# Patient Record
Sex: MALE | Race: WHITE | NOT HISPANIC OR LATINO | URBAN - METROPOLITAN AREA
[De-identification: names, ages, dates, MRNs, and addresses within clinical notes are randomized per-mention and may not be internally consistent; named-entity substitution may affect disease eponyms.]

---

## 2023-01-14 ENCOUNTER — EMERGENCY (EMERGENCY)
Facility: HOSPITAL | Age: 43
LOS: 0 days | Discharge: HOME | End: 2023-01-14
Attending: EMERGENCY MEDICINE | Admitting: EMERGENCY MEDICINE
Payer: COMMERCIAL

## 2023-01-14 VITALS
WEIGHT: 188.05 LBS | HEIGHT: 69 IN | DIASTOLIC BLOOD PRESSURE: 88 MMHG | OXYGEN SATURATION: 98 % | TEMPERATURE: 98 F | SYSTOLIC BLOOD PRESSURE: 135 MMHG | RESPIRATION RATE: 19 BRPM | HEART RATE: 85 BPM
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
## 2023-01-14 DIAGNOSIS — S92.512A DISPLACED FRACTURE OF PROXIMAL PHALANX OF LEFT LESSER TOE(S), INITIAL ENCOUNTER FOR CLOSED FRACTURE: ICD-10-CM

## 2023-01-14 DIAGNOSIS — M79.672 PAIN IN LEFT FOOT: ICD-10-CM

## 2023-01-14 PROCEDURE — 99284 EMERGENCY DEPT VISIT MOD MDM: CPT

## 2023-01-14 PROCEDURE — 73630 X-RAY EXAM OF FOOT: CPT | Mod: 26,LT

## 2023-01-14 RX ORDER — IBUPROFEN 200 MG
600 TABLET ORAL ONCE
Refills: 0 | Status: DISCONTINUED | OUTPATIENT
Start: 2023-01-14 | End: 2023-01-14

## 2023-01-14 NOTE — ED PROVIDER NOTE - CARE PROVIDER_API CALL
Indio Akins (DPM)  Podiatric Medicine and Surgery  [address], 1st [address], [address]  [place] [address]  Phone: [phone]  Fax: [phone]  Follow Up Time:

## 2023-01-14 NOTE — ED PROVIDER NOTE - NS ED ATTENDING STATEMENT MOD
This was a shared visit with the ARMAND. I reviewed and verified the documentation and independently performed the documented:

## 2023-01-14 NOTE — ED PROVIDER NOTE - OBJECTIVE STATEMENT
42y M no [facility] presents for eval of L foot injury. Pt was walking at home when he hit his L 4th toe into furniture and since has moderate aching pain localized with toe with associated deformity and bruising. Aggravated by touch, no relieving factors. Denies numbness, weakness

## 2023-01-14 NOTE — ED PROVIDER NOTE - CLINICAL SUMMARY MEDICAL DECISION MAKING FREE TEXT BOX
42-year-old male, no past medical history, comes in complaining of left foot pain which started when he stubbed his fourth toe into the furniture.  Now he is toe is deformed and bruised.  Aggravated by touch and ambulation.  No other injuries.  On exam, pt in NAD, AAOx3, head NC/AT, CN II-XII intact, lungs CTA B/L, CV S1S2 regular, abdomen soft/NT/ND/(+)BS, ext (+) TTP and deformity to left 4th digit, motor 5/5x4, sensation intact.  X-ray done and shows displaced oblique fracture to left fourth proximal phalanx.  Toe reduced and kelyl taped.  Ortho shoe given.  Will DC with podiatry follow-up.  Motrin for pain.

## 2023-01-14 NOTE — ED PROVIDER NOTE - ATTENDING APP SHARED VISIT CONTRIBUTION OF CARE
42-year-old male, no past medical history, comes in complaining of left foot pain which started when he stubbed his fourth toe into the furniture.  Now he is toe is deformed and bruised.  Aggravated by touch and ambulation.  No other injuries.  On exam, pt in NAD, AAOx3, head NC/AT, CN II-XII intact, lungs CTA B/L, CV S1S2 regular, abdomen soft/NT/ND/(+)BS, ext (+) TTP and deformity to left 4th digit, motor 5/5x4, sensation intact.  X-ray done and shows displaced oblique fracture to left fourth proximal phalanx.  Toe reduced and kelly taped.  Ortho shoe given.  Will DC with podiatry follow-up.  Motrin for pain.

## 2023-01-14 NOTE — ED PROVIDER NOTE - NSFOLLOWUPINSTRUCTIONS_ED_ALL_ED_FT
Follow up with PMD and Podiatry in 1-2 days.    Fracture    A fracture is a break in one of your bones. This can occur from a variety of injuries, especially traumatic ones. Symptoms include pain, bruising, or swelling. Do not use the injured limb. If a fracture is in one of the bones below your waist, do not put weight on that limb unless instructed to do so by your healthcare provider. Crutches or a cane may have been provided. A splint or cast may have been applied by your health care provider. Make sure to keep it dry and follow up with an orthopedist as instructed.    SEEK IMMEDIATE MEDICAL CARE IF YOU HAVE ANY OF THE FOLLOWING SYMPTOMS: numbness, tingling, increasing pain, or weakness in any part of the injured limb.

## 2023-01-14 NOTE — ED PROVIDER NOTE - PATIENT PORTAL LINK FT
You can access the FollowMyHealth Patient Portal offered by [facility] by registering at the following website: [email or web address]. By joining [facility]’s FollowMyHealth portal, you will also be able to view your health information using other applications (apps) compatible with our system.

## 2023-01-14 NOTE — ED PROVIDER NOTE - PROGRESS NOTE DETAILS
During initial evaluation of pt he states he already had an xr preformed prior to an order being placed. Pt has photo of xr showing displaced fx of L 4th toe. Tye from Radiology contacted and states they received a phone call from an unknown caller whe pt first arrived to ED instructing them to preform pts xr STAT and will add it to his chart.

## 2023-01-14 NOTE — ED PROVIDER NOTE - PHYSICAL EXAMINATION
CONST: Well appearing in NAD  CARD: S1 S2  RESP: Equal BS B/L, No distress  MS: Tenderness to L 4th toe with deformity. Normal ROM in all extremities. pulses 2 +. no calf tenderness or swelling  SKIN: Ecchymosis to L 4th toe  NEURO: A&Ox3, No focal deficits. Strength 5/5 with no sensory deficits. Steady gait.

## 2023-04-21 ENCOUNTER — APPOINTMENT (OUTPATIENT)
Dept: CARDIOLOGY | Facility: CLINIC | Age: 43
End: 2023-04-21
Payer: MEDICARE

## 2023-04-21 VITALS — WEIGHT: 188.31 LBS | HEIGHT: 69 IN | BODY MASS INDEX: 27.89 KG/M2

## 2023-04-21 DIAGNOSIS — R68.89 OTHER GENERAL SYMPTOMS AND SIGNS: ICD-10-CM

## 2023-04-21 DIAGNOSIS — M54.9 DORSALGIA, UNSPECIFIED: ICD-10-CM

## 2023-04-21 PROCEDURE — 99204 OFFICE O/P NEW MOD 45 MIN: CPT

## 2023-04-21 PROCEDURE — 93000 ELECTROCARDIOGRAM COMPLETE: CPT

## 2023-04-21 NOTE — PHYSICAL EXAM
[Normal Venous Pressure] : normal venous pressure [Normal S1, S2] : normal S1, S2 [Clear Lung Fields] : clear lung fields [Soft] : abdomen soft [No Edema] : no edema [de-identified] : RRR

## 2023-04-21 NOTE — PHYSICAL EXAM
[Normal Venous Pressure] : normal venous pressure [Normal S1, S2] : normal S1, S2 [Clear Lung Fields] : clear lung fields [Soft] : abdomen soft [No Edema] : no edema [de-identified] : RRR

## 2023-04-21 NOTE — DISCUSSION/SUMMARY
[FreeTextEntry1] : pt on rehab and disabled and unable to exercise etiology and will have low HDL \par will get Calcium score. \par get fasting bloodwork \par diet control TG \par f/u in 4 months  [EKG obtained to assist in diagnosis and management of assessed problem(s)] : EKG obtained to assist in diagnosis and management of assessed problem(s)

## 2023-04-21 NOTE — HISTORY OF PRESENT ILLNESS
[FreeTextEntry1] : Pt with father with CAD (Mi in late early 60's carotid), polycythemia vera. pt father is jadon chaidez. pt ekg midly abnormal. pt with recent neck surgery and no cp or sob. HDL 37 low \par ekg: nsr LVH. \par pt not active and disabled from neck surgery. T and  \par T was eating bad and sedantary unable to exercise. \par H/H; 15.7/47/7 and gets phlebotomy every visit with hem q 2 months \par no sob or cp \par \par echo: 23 at [facility]: \par lvef 60%, normal

## 2023-08-14 ENCOUNTER — OUTPATIENT (OUTPATIENT)
Dept: OUTPATIENT SERVICES | Facility: HOSPITAL | Age: 43
LOS: 1 days | End: 2023-08-14
Payer: COMMERCIAL

## 2023-08-14 ENCOUNTER — RESULT REVIEW (OUTPATIENT)
Age: 43
End: 2023-08-14

## 2023-08-14 DIAGNOSIS — R94.31 ABNORMAL ELECTROCARDIOGRAM [ECG] [EKG]: ICD-10-CM

## 2023-08-14 DIAGNOSIS — Z00.8 ENCOUNTER FOR OTHER GENERAL EXAMINATION: ICD-10-CM

## 2023-08-14 PROCEDURE — 75571 CT HRT W/O DYE W/CA TEST: CPT | Mod: 26,MH

## 2023-08-14 PROCEDURE — 75571 CT HRT W/O DYE W/CA TEST: CPT

## 2023-08-15 DIAGNOSIS — R94.31 ABNORMAL ELECTROCARDIOGRAM [ECG] [EKG]: ICD-10-CM

## 2023-08-21 ENCOUNTER — APPOINTMENT (OUTPATIENT)
Dept: CARDIOLOGY | Facility: CLINIC | Age: 43
End: 2023-08-21
Payer: MEDICARE

## 2023-08-21 VITALS — BODY MASS INDEX: 26.66 KG/M2 | WEIGHT: 180 LBS | HEIGHT: 69 IN

## 2023-08-21 VITALS — HEART RATE: 70 BPM | DIASTOLIC BLOOD PRESSURE: 80 MMHG | SYSTOLIC BLOOD PRESSURE: 120 MMHG

## 2023-08-21 DIAGNOSIS — Z00.00 ENCOUNTER FOR GENERAL ADULT MEDICAL EXAMINATION W/OUT ABNORMAL FINDINGS: ICD-10-CM

## 2023-08-21 PROCEDURE — 99213 OFFICE O/P EST LOW 20 MIN: CPT

## 2023-08-21 RX ORDER — ATORVASTATIN CALCIUM 40 MG/1
40 TABLET, FILM COATED ORAL
Refills: 0 | Status: ACTIVE | COMMUNITY

## 2023-08-21 NOTE — DISCUSSION/SUMMARY
[FreeTextEntry1] : pt on rehab and disabled and unable to exercise etiology and will have low HDL  elevated cholesterol stop atkins pt says already stopped  get bloodwork  CAC is good  f/u in 4 months  continue atorvastatin

## 2023-08-21 NOTE — PHYSICAL EXAM
[Normal Venous Pressure] : normal venous pressure [Normal S1, S2] : normal S1, S2 [Clear Lung Fields] : clear lung fields [Soft] : abdomen soft [No Edema] : no edema [de-identified] : RRR

## 2023-08-21 NOTE — HISTORY OF PRESENT ILLNESS
[FreeTextEntry1] : Pt with father with CAD (Mi in late early 60's carotid), polycythemia vera. pt father is jadon chaidez. pt ekg midly abnormal. NECK SURGERY HDL 37 low  ekg: nsr LVH.  pt not active and disabled from neck surgery. T and   T was eating bad and sedentary unable to exercise.  H/H; 15.7/47/7 and gets phlebotomy every visit with hem q 2 months  no sob or cp   echo: 23 at [facility]:  lvef 60%, normal   23: 23: CAC: 0 Patient denies any symptoms of cp or sob at this time. Patient not able to exercise due to disability from neck pain and surgery did not work.  : HDL 37  T  pt had ablation of C2 in neck and patient says not in rehab at this time.pt SAYS TC was 265 but does not remember where it was done, and patient and patient was doing ATKINS

## 2024-04-09 ENCOUNTER — APPOINTMENT (OUTPATIENT)
Dept: CARDIOLOGY | Facility: CLINIC | Age: 44
End: 2024-04-09
Payer: MEDICARE

## 2024-04-09 VITALS — HEIGHT: 69 IN | BODY MASS INDEX: 27.11 KG/M2 | WEIGHT: 183 LBS

## 2024-04-09 VITALS — DIASTOLIC BLOOD PRESSURE: 80 MMHG | SYSTOLIC BLOOD PRESSURE: 130 MMHG | HEART RATE: 74 BPM

## 2024-04-09 DIAGNOSIS — R94.31 ABNORMAL ELECTROCARDIOGRAM [ECG] [EKG]: ICD-10-CM

## 2024-04-09 DIAGNOSIS — E78.2 MIXED HYPERLIPIDEMIA: ICD-10-CM

## 2024-04-09 PROCEDURE — 99214 OFFICE O/P EST MOD 30 MIN: CPT

## 2024-04-09 NOTE — HISTORY OF PRESENT ILLNESS
[FreeTextEntry1] : Pt with  CAC: zero, father with CAD (Mi in late early 60's carotid), polycythemia vera. pt father is jadon chaidez. pt ekg midly abnormal. NECK SURGERY HDL 37 low   ekg: nsr LVH.  pt not active and disabled from neck surgery. T and   T was eating bad and sedentary unable to exercise.  H/H; 15.7/47/7 and gets phlebotomy every visit with hem q 2 months  no sob or cp   echo: 23 at [facility]:  lvef 60%, normal   23: 23: CAC: 0 Patient denies any symptoms of cp or sob at this time. Patient not able to exercise due to disability from neck pain and surgery FOR NECK UNSUCCESFUL  23: HDL 37  T  pt had ablation of C2 in neck and patient says not in rehab at this time.pt SAYS TC was 265 but does not remember where it was done, and patient and patient was doing ATKINS   24: labs: 24: 15.9/48.1 gets phlebotomy every 3 months.  HDL 48 T  pt feels well and taking medication daily. pt says feels better. but does not exercise due to neck PT ON DISABILITY FROM NECK,  PT DOES NOT WANT TO INCREASE STATIN.  bp improved to 130/90

## 2024-04-09 NOTE — PHYSICAL EXAM
[Normal Venous Pressure] : normal venous pressure [Normal S1, S2] : normal S1, S2 [Clear Lung Fields] : clear lung fields [Soft] : abdomen soft [No Edema] : no edema [de-identified] : RRR

## 2024-04-09 NOTE — DISCUSSION/SUMMARY
[FreeTextEntry1] : pt on rehab and disabled and unable to exercise etiology and will have low HDL  elevated cholesterol stop atkins pt says already stopped  CAC zero  continue atorvastatin 40 mg po qhs  RESISTANT TO INCREASE statin and SAYS WILL EAT BETTER.  f/u 6 months, bloodwork 2 d echo  advised to follow dash diet, keep sodium less than 1500mg daily, increase fiber.

## 2024-10-01 ENCOUNTER — APPOINTMENT (OUTPATIENT)
Dept: CARDIOLOGY | Facility: CLINIC | Age: 44
End: 2024-10-01
Payer: MEDICARE

## 2024-10-01 PROCEDURE — 93306 TTE W/DOPPLER COMPLETE: CPT

## 2024-10-14 PROBLEM — R93.1 ABNORMAL ECHOCARDIOGRAM: Status: ACTIVE | Noted: 2024-10-14

## 2024-10-15 ENCOUNTER — APPOINTMENT (OUTPATIENT)
Dept: CARDIOLOGY | Facility: CLINIC | Age: 44
End: 2024-10-15
Payer: MEDICARE

## 2024-10-15 VITALS — HEIGHT: 69 IN | BODY MASS INDEX: 26.81 KG/M2 | WEIGHT: 181 LBS

## 2024-10-15 VITALS — DIASTOLIC BLOOD PRESSURE: 79 MMHG | SYSTOLIC BLOOD PRESSURE: 113 MMHG | HEART RATE: 88 BPM

## 2024-10-15 DIAGNOSIS — E78.2 MIXED HYPERLIPIDEMIA: ICD-10-CM

## 2024-10-15 DIAGNOSIS — R93.1 ABNORMAL FINDINGS ON DIAGNOSTIC IMAGING OF HEART AND CORONARY CIRCULATION: ICD-10-CM

## 2024-10-15 DIAGNOSIS — R94.31 ABNORMAL ELECTROCARDIOGRAM [ECG] [EKG]: ICD-10-CM

## 2024-10-15 PROCEDURE — G2211 COMPLEX E/M VISIT ADD ON: CPT

## 2024-10-15 PROCEDURE — 99214 OFFICE O/P EST MOD 30 MIN: CPT

## 2024-11-22 ENCOUNTER — EMERGENCY (EMERGENCY)
Facility: HOSPITAL | Age: 44
LOS: 0 days | Discharge: HOME | End: 2024-11-23
Attending: STUDENT IN AN ORGANIZED HEALTH CARE EDUCATION/TRAINING PROGRAM
Payer: MEDICARE

## 2024-11-22 VITALS
RESPIRATION RATE: 18 BRPM | HEIGHT: 69 IN | WEIGHT: 184.09 LBS | OXYGEN SATURATION: 95 % | SYSTOLIC BLOOD PRESSURE: 129 MMHG | DIASTOLIC BLOOD PRESSURE: 67 MMHG | TEMPERATURE: 98 F | HEART RATE: 100 BPM

## 2024-11-22 DIAGNOSIS — S01.511A LACERATION WITHOUT FOREIGN BODY OF LIP, INITIAL ENCOUNTER: ICD-10-CM
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
## 2024-11-22 DIAGNOSIS — E78.5 HYPERLIPIDEMIA, UNSPECIFIED: ICD-10-CM

## 2024-11-22 PROCEDURE — 99285 EMERGENCY DEPT VISIT HI MDM: CPT

## 2024-11-22 PROCEDURE — 40654 RPR LIP FTH>1HALF VER HT/CPX: CPT

## 2024-11-22 PROCEDURE — 99284 EMERGENCY DEPT VISIT MOD MDM: CPT

## 2024-11-22 PROCEDURE — 40650 RPR LIP FTH VERMILION ONLY: CPT

## 2024-11-22 PROCEDURE — 96374 THER/PROPH/DIAG INJ IV PUSH: CPT | Mod: XU

## 2024-11-22 NOTE — ED ADULT TRIAGE NOTE - CHIEF COMPLAINT QUOTE
Pt c/o dog bite to left upper lip about 30 min ago, noted with laceration. Pt stated dog is UTD with vaccines

## 2024-11-23 VITALS
OXYGEN SATURATION: 95 % | RESPIRATION RATE: 18 BRPM | HEART RATE: 94 BPM | SYSTOLIC BLOOD PRESSURE: 110 MMHG | TEMPERATURE: 98 F | DIASTOLIC BLOOD PRESSURE: 75 MMHG

## 2024-11-23 DIAGNOSIS — Z98.1 ARTHRODESIS STATUS: Chronic | ICD-10-CM

## 2024-11-23 RX ORDER — AMOXICILLIN AND CLAVULANATE POTASSIUM 600; 42.9 MG/5ML; MG/5ML
1 POWDER, FOR SUSPENSION ORAL ONCE
Refills: 0 | Status: COMPLETED | OUTPATIENT
Start: 2024-11-23 | End: 2024-11-23

## 2024-11-23 RX ORDER — AMOXICILLIN AND CLAVULANATE POTASSIUM 600; 42.9 MG/5ML; MG/5ML
875 POWDER, FOR SUSPENSION ORAL
Qty: 20 | Refills: 0
Start: 2024-11-23 | End: 2024-12-02

## 2024-11-23 RX ORDER — IBUPROFEN 200 MG
600 TABLET ORAL ONCE
Refills: 0 | Status: COMPLETED | OUTPATIENT
Start: 2024-11-23 | End: 2024-11-23

## 2024-11-23 RX ORDER — AMPICILLIN AND SULBACTAM 2; 1 G/1; G/1
3 INJECTION, POWDER, FOR SOLUTION INTRAMUSCULAR; INTRAVENOUS ONCE
Refills: 0 | Status: COMPLETED | OUTPATIENT
Start: 2024-11-23 | End: 2024-11-23

## 2024-11-23 RX ORDER — AMPICILLIN AND SULBACTAM 2; 1 G/1; G/1
1.5 INJECTION, POWDER, FOR SOLUTION INTRAMUSCULAR; INTRAVENOUS ONCE
Refills: 0 | Status: DISCONTINUED | OUTPATIENT
Start: 2024-11-23 | End: 2024-11-23

## 2024-11-23 RX ADMIN — AMOXICILLIN AND CLAVULANATE POTASSIUM 1 TABLET(S): 600; 42.9 POWDER, FOR SUSPENSION ORAL at 00:27

## 2024-11-23 RX ADMIN — Medication 600 MILLIGRAM(S): at 00:35

## 2024-11-23 RX ADMIN — AMPICILLIN AND SULBACTAM 200 GRAM(S): 2; 1 INJECTION, POWDER, FOR SOLUTION INTRAMUSCULAR; INTRAVENOUS at 03:47

## 2024-11-23 NOTE — ED PROVIDER NOTE - NSFOLLOWUPINSTRUCTIONS_ED_ALL_ED_FT
Animal Bite    WHAT YOU NEED TO KNOW:    What do I need to know about an animal bite? Animal bite injuries range from shallow cuts to deep, life-threatening wounds. An animal can cut or puncture the skin when it bites. Your skin may be torn away. Your skin may swell or bruise even if the bite does not break the skin. Animal bites occur more often on the hands, arms, legs, and face. Bites from dogs and cats are the most common injuries.    What does my healthcare provider need to know about my animal bite?    What kind of animal bit you? Is the animal a pet? If so, are its vaccines updated?    When and where did the bite happen? Was the animal bothered by you or another person before it bit? Did the animal show any fear?    Can the animal be brought in to watch it for sickness or disease?    Has the wound been treated? If so, what did you use to treat it?    Do you have any health conditions? Do you currently take any medicines? When was your last tetanus shot?  Which tests may I need after an animal bite? Your healthcare provider will look at how big and deep the bite wounds are. Tell your provider if any area feels numb. Your provider will check how well you can move the bitten area and check for signs of infection. You may also need the following:    Blood tests and a sample of fluid or tissue from your wound may show if you have an infection.    An x-ray may show fractures or foreign objects in your wound.  How is an animal bite treated?    Irrigation and debridement may be needed to clean out your wound. Dead, damaged, or infected tissue may be removed to help your wound heal.    Medicines:  Antibiotics prevent or treat a bacterial infection.    Prescription pain medicine may be given. Ask your healthcare provider how to take this medicine safely. Some prescription pain medicines contain acetaminophen. Do not take other medicines that contain acetaminophen without talking to your healthcare provider. Too much acetaminophen may cause liver damage. Prescription pain medicine may cause constipation. Ask your healthcare provider how to prevent or treat constipation.    A tetanus vaccine may be needed to prevent tetanus. Tetanus is a life-threatening bacterial infection that affects the nerves and muscles. The bacteria can be spread through animal bites.    A rabies vaccine may be needed to prevent rabies. Rabies is a life-threatening viral infection. The virus can be spread through animal bites.    Stitches may be needed if your wound is large and not infected.    Surgery may be needed to repair deep injuries or severe wounds.  What can I do to manage my symptoms?    Apply antibiotic ointment as directed. This helps prevent infection in minor skin wounds. It is available without a doctor's order.    Keep the wound clean and covered. Wash the wound every day with soap and water or germ-killing cleanser. Ask your healthcare provider about the kinds of bandages to use.    Apply ice on your wound. Ice helps decrease swelling and pain. Ice may also help prevent tissue damage. Use an ice pack, or put crushed ice in a plastic bag. Cover it with a towel and place it on your wound for 15 to 20 minutes every hour or as directed.    Elevate the wound area. Raise your wound above the level of your heart as often as you can. This will help decrease swelling and pain. Prop your wound on pillows or blankets to keep it elevated comfortably.  What can I do to prevent an animal bite?    Learn to recognize the signs of a scared animal. Avoid quick, sudden movements.    Do not step between animals that are fighting.    Do not leave an animal alone with a young child, even if it is a pet.    Do not disturb an animal while it eats, sleeps, or cares for its young.    Do not approach an animal you do not know, especially one that is tied up or caged.    Stay away from animals that seem sick or act strangely.    Do not feed or capture wild animals.  When should I seek immediate care?    You have a fever.    Your wound is red, swollen, and draining pus.    You see red streaks on the skin around the wound.    You can no longer move the bitten area.    Your heartbeat and breathing are much faster than usual.    You feel dizzy and confused.  When should I call my doctor?    Your pain does not get better, even after you take pain medicine.    You have nightmares or flashbacks about the animal bite.    You have questions or concerns about your condition or care.  CARE AGREEMENT:    You have the right to help plan your care. Learn about your health condition and how it may be treated. Discuss treatment options with your healthcare providers to decide what care you want to receive. You always have the right to refuse treatment.    © [employer] 1973, 2024

## 2024-11-23 NOTE — ED PROVIDER NOTE - PHYSICAL EXAMINATION
CONSTITUTIONAL: Well-developed; well-nourished; in no acute distress.   SKIN: warm, dry.  HEAD: Normocephalic; atraumatic.  EYES: PERRL, EOMI, no conjunctival erythema.  ENMT: No nasal discharge; airway clear. left upper lip with V shaped flap disrupting his vermilion border. no dental trauma.   NECK: Supple; non tender.  EXT: Normal ROM.  No clubbing, cyanosis or edema.   NEURO: Alert, oriented, grossly unremarkable.  PSYCH: Cooperative, appropriate.

## 2024-11-23 NOTE — ED PROVIDER NOTE - PATIENT PORTAL LINK FT
You can access the FollowMyHealth Patient Portal offered by [facility] by registering at the following website: [email or web address]. By joining [email or web address]’s FollowMyHealth portal, you will also be able to view your health information using other applications (apps) compatible with our system. You can access the FollowMyHealth Patient Portal offered by [facility] by registering at the following website: [email or web address]. By joining [employer]’s FollowMyHealth portal, you will also be able to view your health information using other applications (apps) compatible with our system.

## 2024-11-23 NOTE — CONSULT NOTE ADULT - ASSESSMENT
Augmentin x7 days  Tetanus shot   Please apply bacitracin BID  Please follow up with Dr. Becca Ojeda in 1 week  Please avoid scrubbing at the incision, water ok to wash over it ASSESSMENT:  The patient is a 43 year old male who presents to the ED s/p dog bite to the face by his own dog. Per the patient and his family at bedside, the dog is temperamental and gets easily agitated and bit the patient's face when he was petting him. The dog is up to date on vaccines, but the patient is unsure of when he last had his tetanus shot. The patient has an obvious laceration to the upper lip vermillion boarder. S/p bedside laceration repair with sutures.    PLAN:  - 1 dose Unasyn in ED, discharge with Augmentin x7 days  - Patient unsure of date of last tetanus shot - please give booster  - Patient should apply bacitracin over laceration BID  - Please avoid scrubbing at the incision, water ok to wash over it  - Please follow up with Dr. Becca Ojeda in 1 week    Above plan discussed with Attending Surgeon Dr. Ojeda, patient, patient family, and ED  11-23-24 @ 03:14

## 2024-11-23 NOTE — ED ADULT NURSE NOTE - HIV OFFER
Normal rate, regular rhythm.  Heart sounds S1, S2.  No murmurs, rubs or gallops. Opt out Normal rate, regular rhythm.  Heart sounds S1, S2.

## 2024-11-23 NOTE — ED ADULT NURSE NOTE - LOCATION
lip Posterior Auricular Interpolation Flap Text: A decision was made to reconstruct the defect utilizing an interpolation axial flap and a staged reconstruction.  A telfa template was made of the defect.  This telfa template was then used to outline the posterior auricular interpolation flap.  The donor area for the pedicle flap was then injected with anesthesia.  The flap was excised through the skin and subcutaneous tissue down to the layer of the underlying musculature.  The pedicle flap was carefully excised within this deep plane to maintain its blood supply.  The edges of the donor site were undermined.   The donor site was closed in a primary fashion.  The pedicle was then rotated into position and sutured.  Once the tube was sutured into place, adequate blood supply was confirmed with blanching and refill.  The pedicle was then wrapped with xeroform gauze and dressed appropriately with a telfa and gauze bandage to ensure continued blood supply and protect the attached pedicle.

## 2024-11-23 NOTE — ED PROVIDER NOTE - OBJECTIVE STATEMENT
43-year-old male with history of hyperlipidemia, tetanus up-to-date presenting today for evaluation of laceration to the upper left lip.  States that his dog bit his lip, dog is vaccinated for rabies.  Denies any other lacerations.

## 2024-11-23 NOTE — ED PROVIDER NOTE - CARE PROVIDERS DIRECT ADDRESSES
[email or web address]

## 2024-11-23 NOTE — ED PROVIDER NOTE - CLINICAL SUMMARY MEDICAL DECISION MAKING FREE TEXT BOX
43-year-old male with history of hyperlipidemia, tetanus up-to-date presenting today for evaluation of laceration to the upper left lip.  States that his dog bit his lip, dog is vaccinated for rabies.  Denies any other lacerations.  sign out received from Dr. Degroot pending plastics consult.  Laceration was repaired by surgery - recommending 1 dose of IV unsayn prior to discharge, outpatient Augmentin, and follow up in 1 week with Dr. Ojeda.  Discussed return precautions and follow up outpatient.  Patient comfortable with plan.

## 2024-11-23 NOTE — ED ADULT NURSE NOTE - OBJECTIVE STATEMENT
Patient reports his dog bit him on the lips, up to date with vaccines. Patient's top middle lip laceration through

## 2024-11-23 NOTE — ED PROVIDER NOTE - CARE PROVIDER_API CALL
Becca Ojeda  Plastic Surgery  [address] [place] [address], [address]  [place] [address]  Phone: [phone]  Fax: [phone]  Follow Up Time: 1-3 Days

## 2024-11-27 ENCOUNTER — APPOINTMENT (OUTPATIENT)
Dept: PLASTIC SURGERY | Facility: CLINIC | Age: 44
End: 2024-11-27

## 2025-04-15 ENCOUNTER — APPOINTMENT (OUTPATIENT)
Dept: CARDIOLOGY | Facility: CLINIC | Age: 45
End: 2025-04-15
Payer: MEDICARE

## 2025-04-15 ENCOUNTER — NON-APPOINTMENT (OUTPATIENT)
Age: 45
End: 2025-04-15

## 2025-04-15 VITALS
HEIGHT: 69 IN | SYSTOLIC BLOOD PRESSURE: 120 MMHG | DIASTOLIC BLOOD PRESSURE: 80 MMHG | RESPIRATION RATE: 14 BRPM | HEART RATE: 74 BPM | BODY MASS INDEX: 26.96 KG/M2 | OXYGEN SATURATION: 98 % | WEIGHT: 182 LBS

## 2025-04-15 DIAGNOSIS — E87.5 HYPERKALEMIA: ICD-10-CM

## 2025-04-15 DIAGNOSIS — R94.31 ABNORMAL ELECTROCARDIOGRAM [ECG] [EKG]: ICD-10-CM

## 2025-04-15 DIAGNOSIS — E78.2 MIXED HYPERLIPIDEMIA: ICD-10-CM

## 2025-04-15 DIAGNOSIS — R93.1 ABNORMAL FINDINGS ON DIAGNOSTIC IMAGING OF HEART AND CORONARY CIRCULATION: ICD-10-CM

## 2025-04-15 PROCEDURE — 93000 ELECTROCARDIOGRAM COMPLETE: CPT

## 2025-04-15 PROCEDURE — 99204 OFFICE O/P NEW MOD 45 MIN: CPT

## 2025-04-16 LAB
ALBUMIN SERPL ELPH-MCNC: 4.7 G/DL
ALP BLD-CCNC: 62 U/L
ALT SERPL-CCNC: 40 U/L
ANION GAP SERPL CALC-SCNC: 14 MMOL/L
AST SERPL-CCNC: 19 U/L
BILIRUB SERPL-MCNC: 0.5 MG/DL
BUN SERPL-MCNC: 15 MG/DL
CALCIUM SERPL-MCNC: 9.3 MG/DL
CHLORIDE SERPL-SCNC: 101 MMOL/L
CHOLEST SERPL-MCNC: 186 MG/DL
CO2 SERPL-SCNC: 24 MMOL/L
CREAT SERPL-MCNC: 0.96 MG/DL
EGFRCR SERPLBLD CKD-EPI 2021: 99 ML/MIN/1.73M2
ESTIMATED AVERAGE GLUCOSE: 108 MG/DL
GLUCOSE SERPL-MCNC: 100 MG/DL
HBA1C MFR BLD HPLC: 5.4 %
HCT VFR BLD CALC: 47.3 %
HDLC SERPL-MCNC: 38 MG/DL
HGB BLD-MCNC: 16.3 G/DL
LDLC SERPL-MCNC: 88 MG/DL
MCHC RBC-ENTMCNC: 29.9 PG
MCHC RBC-ENTMCNC: 34.5 G/DL
MCV RBC AUTO: 86.6 FL
NONHDLC SERPL-MCNC: 149 MG/DL
NT-PROBNP SERPL-MCNC: <36 PG/ML
PLATELET # BLD AUTO: 273 K/UL
POTASSIUM SERPL-SCNC: 4.3 MMOL/L
PROT SERPL-MCNC: 7.4 G/DL
RBC # BLD: 5.46 M/UL
RBC # FLD: 13.7 %
SODIUM SERPL-SCNC: 140 MMOL/L
TRIGL SERPL-MCNC: 369 MG/DL
TSH SERPL-ACNC: 1.09 UIU/ML
WBC # FLD AUTO: 8.55 K/UL